# Patient Record
Sex: MALE | Race: WHITE | Employment: FULL TIME | URBAN - METROPOLITAN AREA
[De-identification: names, ages, dates, MRNs, and addresses within clinical notes are randomized per-mention and may not be internally consistent; named-entity substitution may affect disease eponyms.]

---

## 2024-05-07 ENCOUNTER — HOSPITAL ENCOUNTER (EMERGENCY)
Age: 69
Discharge: HOME | End: 2024-05-07
Payer: MEDICARE

## 2024-05-07 VITALS
OXYGEN SATURATION: 93 % | WEIGHT: 237 LBS | DIASTOLIC BLOOD PRESSURE: 93 MMHG | RESPIRATION RATE: 16 BRPM | BODY MASS INDEX: 33.18 KG/M2 | SYSTOLIC BLOOD PRESSURE: 174 MMHG | HEIGHT: 71 IN | TEMPERATURE: 98.1 F | HEART RATE: 83 BPM

## 2024-05-07 DIAGNOSIS — M70.22 OLECRANON BURSITIS OF LEFT ELBOW: Primary | ICD-10-CM

## 2024-05-07 PROCEDURE — 99283 EMERGENCY DEPT VISIT LOW MDM: CPT

## 2024-05-07 ASSESSMENT — ENCOUNTER SYMPTOMS
SHORTNESS OF BREATH: 0
COUGH: 0
ABDOMINAL PAIN: 0
NAUSEA: 0
VOMITING: 0
COLOR CHANGE: 0
BACK PAIN: 0
RESPIRATORY NEGATIVE: 1

## 2024-05-07 ASSESSMENT — PAIN SCALES - GENERAL: PAINLEVEL_OUTOF10: 0

## 2024-05-07 ASSESSMENT — PAIN - FUNCTIONAL ASSESSMENT: PAIN_FUNCTIONAL_ASSESSMENT: 0-10

## 2024-05-07 ASSESSMENT — LIFESTYLE VARIABLES
HOW MANY STANDARD DRINKS CONTAINING ALCOHOL DO YOU HAVE ON A TYPICAL DAY: 1 OR 2
HOW OFTEN DO YOU HAVE A DRINK CONTAINING ALCOHOL: 2-4 TIMES A MONTH

## 2024-05-08 ENCOUNTER — TELEPHONE (OUTPATIENT)
Dept: ORTHOPEDIC SURGERY | Age: 69
End: 2024-05-08

## 2024-05-08 NOTE — TELEPHONE ENCOUNTER
Did leave message regarding ED referral for an appointment. Upon return call please schedule with Dr. Escoto

## 2024-05-08 NOTE — ED PROVIDER NOTES
mis-transcribed.)    YOHANNES Hart (electronically signed)       Michael Cummings PA  05/07/24 2032

## 2024-05-09 NOTE — TELEPHONE ENCOUNTER
2nd attempt: Did leave message regarding ED referral for an appointment. Upon return call please schedule with Dr. Escoto   Normal vision: sees adequately in most situations; can see medication labels, newsprint

## 2024-08-17 ENCOUNTER — OFFICE VISIT (OUTPATIENT)
Age: 69
End: 2024-08-17

## 2024-08-17 VITALS
OXYGEN SATURATION: 98 % | BODY MASS INDEX: 31.66 KG/M2 | TEMPERATURE: 98.1 F | HEART RATE: 79 BPM | SYSTOLIC BLOOD PRESSURE: 128 MMHG | WEIGHT: 227 LBS | DIASTOLIC BLOOD PRESSURE: 64 MMHG | RESPIRATION RATE: 16 BRPM

## 2024-08-17 DIAGNOSIS — R42 DIZZINESS: Primary | ICD-10-CM

## 2024-08-17 PROCEDURE — 1123F ACP DISCUSS/DSCN MKR DOCD: CPT

## 2024-08-17 PROCEDURE — 99202 OFFICE O/P NEW SF 15 MIN: CPT

## 2024-08-17 ASSESSMENT — ENCOUNTER SYMPTOMS
WHEEZING: 0
VOMITING: 0
COUGH: 0
ABDOMINAL PAIN: 0
CONSTIPATION: 0
SHORTNESS OF BREATH: 0
DIARRHEA: 0

## 2024-08-17 NOTE — PROGRESS NOTES
appears to be stable.  Vitals are all WNL.  Patient is cleared to return to work on Monday, 8/19/2024.    - Rest.  - Avoid excessive heat exposure.  - Increase fluid intake, especially water.  - Work excuse provided, stable to return to work Monday (8/19/24).   - Return to the clinic or follow up with PCP if symptoms worsen or fail to improve.     Patient provided educational materials- see patient instructions.  Discussed administration, benefit, and side effects of any prescribed or OTC medications.  All patient questions answered appropriately.  Patient voiced understanding.     Return if symptoms worsen or fail to improve.    Urgent Care evaluation today is not a substitute for PCP visit. Follow up care is the responsibility of the patient to discuss and review this Urgent Care visit.    No orders of the defined types were placed in this encounter.      No results found for this visit on 08/17/24.    No orders of the defined types were placed in this encounter.       Patient Instructions   - Rest.  - Avoid excessive heat exposure.  - Increase fluid intake, especially water.  - Work excuse provided, stable to return to work Monday (8/19/24).   - Return to the clinic or follow up with PCP if symptoms worsen or fail to improve.        Electronically signed by DAMIEN Underwood CNP on 8/17/2024 at 2:44 PM

## 2024-08-17 NOTE — PATIENT INSTRUCTIONS
- Rest.  - Avoid excessive heat exposure.  - Increase fluid intake, especially water.  - Work excuse provided, stable to return to work Monday (8/19/24).   - Return to the clinic or follow up with PCP if symptoms worsen or fail to improve.